# Patient Record
Sex: FEMALE | Employment: FULL TIME | URBAN - METROPOLITAN AREA
[De-identification: names, ages, dates, MRNs, and addresses within clinical notes are randomized per-mention and may not be internally consistent; named-entity substitution may affect disease eponyms.]

---

## 2024-05-07 ENCOUNTER — TELEPHONE (OUTPATIENT)
Dept: PAIN MEDICINE | Facility: CLINIC | Age: 39
End: 2024-05-07
Payer: MEDICAID

## 2024-05-07 NOTE — TELEPHONE ENCOUNTER
----- Message from Shona Lopez sent at 5/7/2024  8:06 AM CDT -----  Regarding: appt  Pt calling in regards to her appt being cancelled and no one reaching out , pt is very upset  and     crying . Says she waited a year for this appt , please call     Confirmed patient's contact info below:  Contact Name: Yadi Arias  Phone Number: [phone]